# Patient Record
Sex: MALE | Race: BLACK OR AFRICAN AMERICAN | NOT HISPANIC OR LATINO | URBAN - METROPOLITAN AREA
[De-identification: names, ages, dates, MRNs, and addresses within clinical notes are randomized per-mention and may not be internally consistent; named-entity substitution may affect disease eponyms.]

---

## 2019-06-28 ENCOUNTER — EMERGENCY (EMERGENCY)
Facility: HOSPITAL | Age: 29
LOS: 1 days | Discharge: HOME | End: 2019-06-28
Attending: EMERGENCY MEDICINE | Admitting: EMERGENCY MEDICINE
Payer: MEDICAID

## 2019-06-28 VITALS
SYSTOLIC BLOOD PRESSURE: 124 MMHG | OXYGEN SATURATION: 100 % | RESPIRATION RATE: 15 BRPM | TEMPERATURE: 98 F | DIASTOLIC BLOOD PRESSURE: 74 MMHG | HEART RATE: 76 BPM

## 2019-06-28 DIAGNOSIS — M79.602 PAIN IN LEFT ARM: ICD-10-CM

## 2019-06-28 DIAGNOSIS — L73.9 FOLLICULAR DISORDER, UNSPECIFIED: ICD-10-CM

## 2019-06-28 PROCEDURE — 99283 EMERGENCY DEPT VISIT LOW MDM: CPT | Mod: 25

## 2019-06-28 RX ORDER — CEPHALEXIN 500 MG
1 CAPSULE ORAL
Qty: 21 | Refills: 0
Start: 2019-06-28 | End: 2019-07-04

## 2019-06-28 RX ORDER — CEPHALEXIN 500 MG
500 CAPSULE ORAL ONCE
Refills: 0 | Status: COMPLETED | OUTPATIENT
Start: 2019-06-28 | End: 2019-06-28

## 2019-06-28 RX ORDER — IBUPROFEN 200 MG
1 TABLET ORAL
Qty: 30 | Refills: 0
Start: 2019-06-28

## 2019-06-28 RX ORDER — IBUPROFEN 200 MG
1 TABLET ORAL
Qty: 20 | Refills: 0
Start: 2019-06-28 | End: 2019-07-02

## 2019-06-28 RX ORDER — IBUPROFEN 200 MG
600 TABLET ORAL ONCE
Refills: 0 | Status: COMPLETED | OUTPATIENT
Start: 2019-06-28 | End: 2019-06-28

## 2019-06-28 RX ADMIN — Medication 600 MILLIGRAM(S): at 04:24

## 2019-06-28 RX ADMIN — Medication 500 MILLIGRAM(S): at 04:25

## 2019-06-28 RX ADMIN — Medication 1 TABLET(S): at 04:25

## 2019-06-28 NOTE — ED ADULT TRIAGE NOTE - CHIEF COMPLAINT QUOTE
Pt with complaint of lymph node swelling to his left armpit X 2 days. States it is painful. Denies any fevers or chills.

## 2019-06-28 NOTE — ED ADULT NURSE NOTE - NSIMPLEMENTINTERV_GEN_ALL_ED
Implemented All Universal Safety Interventions:  [place] to call system. Call bell, personal items and telephone within reach. Instruct patient to call for assistance. Room bathroom lighting operational. Non-slip footwear when patient is off stretcher. Physically safe environment: no spills, clutter or unnecessary equipment. Stretcher in lowest position, wheels locked, appropriate side rails in place.

## 2019-06-28 NOTE — ED PROVIDER NOTE - OBJECTIVE STATEMENT
28 yo male pt, no hx of med problems, nkda. Presents w L axially area of tenderness for 3 days. no fever, no chills, no chest pain, no sob. no prior episodes of cellulitis/abscess in the past.

## 2019-06-28 NOTE — ED ADULT NURSE NOTE - OBJECTIVE STATEMENT
Pt is a 29y male complaining of right underarm pain. Pt states Pt is a 29y male complaining of right underarm swelling and tenderness x3 days.

## 2019-07-07 ENCOUNTER — EMERGENCY (EMERGENCY)
Facility: HOSPITAL | Age: 29
LOS: 1 days | Discharge: HOME | End: 2019-07-07
Attending: EMERGENCY MEDICINE | Admitting: EMERGENCY MEDICINE
Payer: SELF-PAY

## 2019-07-07 VITALS
HEART RATE: 73 BPM | DIASTOLIC BLOOD PRESSURE: 78 MMHG | OXYGEN SATURATION: 99 % | RESPIRATION RATE: 16 BRPM | SYSTOLIC BLOOD PRESSURE: 127 MMHG | TEMPERATURE: 98 F

## 2019-07-07 DIAGNOSIS — R07.89 OTHER CHEST PAIN: ICD-10-CM

## 2019-07-07 DIAGNOSIS — R21 RASH AND OTHER NONSPECIFIC SKIN ERUPTION: ICD-10-CM

## 2019-07-07 PROCEDURE — 93010 ELECTROCARDIOGRAM REPORT: CPT

## 2019-07-07 PROCEDURE — 71046 X-RAY EXAM CHEST 2 VIEWS: CPT | Mod: 26

## 2019-07-07 PROCEDURE — 99284 EMERGENCY DEPT VISIT MOD MDM: CPT | Mod: 25

## 2019-07-07 NOTE — ED PROVIDER NOTE - OBJECTIVE STATEMENT
Triage note: Pt with c/o skin rash to bilateral hands. Started on abx for swollen Lt axillary lymph node 1 week ago. Also reports some Lt sided Chest discomfort since last night. Hx heart murmur  Vitals: HR 73, /78, Resp 16, Temp(F) 98.1, SpO2 99  Present during history-taking: Elie Oreilly (patient), Caren (pt's partner), Dr. Crow (attending), Manasa Reid (scribe)     29y M with no pertinent PMHx presents to ED c/o rash. Pt states rash began Triage note: Pt with c/o skin rash to bilateral hands. Started on abx for swollen Lt axillary lymph node 1 week ago. Also reports some Lt sided Chest discomfort since last night. Hx heart murmur  Vitals: HR 73, /78, Resp 16, Temp(F) 98.1, SpO2 99  Present during history-taking: Elie Oreilly (patient), Caren (pt's partner), Dr. Crow (attending), Manasa Reid (scribe)     29y M with PMHx heart murmur presents to ED c/o rash. Pt states rash is itchy and began 2 days ago on the back of his right hand. It progressively spread along his right wrist and forearm as well as his left hand and wrist. Pt states rash began soon after taking the bactrim and keflex he was prescribed after he was seen in this ED 4 days ago for left axillar lymph node swelling, which has since improved. Last took abx last night. Pt also notes left-sided chest pain that began when he went to sleep last night, improved upon waking up this morning, and worsened while walking to the ED, with some mild SOB while walking. Pt states the chest pain feels different from the left axillary pain he experienced 4 days ago. No SOB at present. Denies fever/chills, hand pain, weight loss, joint pain, cough. No exposure to new chemicals, soaps, lotions, or animals. Triage note: Pt with c/o skin rash to bilateral hands. Started on abx for swollen Lt axillary lymph node 1 week ago. Also reports some Lt sided Chest discomfort since last night. Hx heart murmur  Vitals: HR 73, /78, Resp 16, Temp(F) 98.1, SpO2 99  Present during history-taking: Elie Oreilly (patient), Caren (pt's partner), Dr. Crow (attending), Manasa Reid (scribe)     29y M with PMHx heart murmur presents to ED c/o rash. Pt states rash is itchy and began 2 days ago on the back of his right hand. It progressively spread along his right wrist and forearm as well as his left hand and wrist. Pt states rash began soon after taking the bactrim and keflex he was prescribed after he was seen in this ED 4 days ago for left axillar lymph node swelling, which has since improved. Last took abx last night. Pt also notes left-sided chest pain that began when he went to sleep last night, improved upon waking up this morning, and then returned while he was waiting in the ER. the cp has resolved at time of HPI.  Pt states the chest pain feels different from the left axillary pain he experienced 4 days ago. No SOB at present. Denies fever/chills, hand pain, weight loss, joint pain, cough. No exposure to new chemicals, soaps, lotions, or animals.

## 2019-07-07 NOTE — ED ADULT NURSE NOTE - PAIN RATING/NUMBER SCALE (0-10): ACTIVITY
A person by the name of Candace Bates has called the clinic 3 times asking for Pt to be seen by a Dr at [facility]. Candace called 1 time yesterday & two times today. The first two times she called she did not know the Pt's last name. Candace told me that she went back to [employer] [employer]. & asked the Pt what her last name &  are. Candace states that Pt has a sore that is not being treated. Candace was told that we do not have permission to discuss health information with her & she would need to discuss her concerns with the nursing home & they can arrange Pt to be seen by a Dr. Candace repeated her story several times until she finally agreed to have the Pt contact her nursing home for treatment.   4

## 2019-07-07 NOTE — ED PROVIDER NOTE - MUSCULOSKELETAL, MLM
Spine appears normal, range of motion is not limited, no muscle or joint tenderness LUE: FROM of all joints. No swelling, erythema, tenderness, increased warmth. Chest wall nontender. No rash, erythema, or swelling.

## 2019-07-07 NOTE — ED PROVIDER NOTE - CLINICAL SUMMARY MEDICAL DECISION MAKING FREE TEXT BOX
ED evaluation and management discussed with the patient and family (if available) in detail.  Close PMD follow up encouraged.  Strict ED return instructions discussed in detail and patient given the opportunity to ask any questions about their discharge diagnosis and instructions. Patient verbalized understanding.    contact dermatitis v drug reaction

## 2019-07-07 NOTE — ED PROVIDER NOTE - DISPOSITION TYPE
Anticoagulation Progress Note      Indication: Cerbral artery occlusion with cerebral infraction, A Fib  Goal INR: 2.0-3.0  Duration: Long Term  Pertinent History: n/a    Assessment  High-Risk Medications: n/a  Significant Findings: n/a  Hospitalizations / Procedures: n/a  Vitamin K goal: 1/2 cup twice a week  Other: n/a      Plan  INR Result: 1.9  The INR result for today is therapeutic based on the INR goal 2.0-3.0.  Etiology: n/a  Recommended Dose: 4.5mg Tue, Thur Sat 3mg ROW (25.5 mg/wk)  Follow-Up: 4 weeks (4/11/2019)  Comments: n/a      Counseling  Counseled about signs/symptoms of bruising/bleeding and appropriate management  Counseled about signs/symptoms of thrombosis and/or stroke and when to seek emergent care  Stressed importance of compliance with exact recommended dosage regimen  Stressed importance of compliance with consistent vitamin K intake  Strongly advised to limit/avoid alcohol consumption  Instructed to notify clinic about medication changes or health status changes  Instructed to notify clinic about scheduled procedures  Discussed risk of thrombosis and/or bleeding with inappropriate anticoagulant use    Patient (or family/caregiver) verbalized understanding and agreement with plan.      
DISCHARGE

## 2019-07-07 NOTE — ED PROVIDER NOTE - HEME LYMPH, MLM
No adenopathy or splenomegaly. No cervical or inguinal lymphadenopathy. No lymphadenopathy on left axilla (where previous lymph node had been identified). left axillary ln normal

## 2019-07-07 NOTE — ED PROVIDER NOTE - NSFOLLOWUPINSTRUCTIONS_ED_ALL_ED_FT
stop antibiotics    follow up with dermatology if symptoms persist or worsen    return to ER for fever worsening of rash or any other concern

## 2019-07-07 NOTE — ED PROVIDER NOTE - DIAGNOSTIC INTERPRETATION
Interpreted by MD Dr. Crow  Chest x-ray, 2 views  Lungs clear, heart shadow normal, bony structures normal, no free air under diaphragm, no PTX

## 2019-07-07 NOTE — ED PROVIDER NOTE - ENMT, MLM
Airway patent, Nasal mucosa clear. Mouth with normal mucosa. Throat has no vesicles, no oropharyngeal exudates and uvula is midline. Airway patent, Nasal mucosa clear. Mouth with normal mucosa. Throat has no vesicles, no oropharyngeal exudates and uvula is midline. no intraoral rask

## 2019-07-07 NOTE — ED ADULT NURSE NOTE - NSIMPLEMENTINTERV_GEN_ALL_ED
Implemented All Universal Safety Interventions:  [place] to call system. Call bell, personal items and telephone within reach. Instruct patient to call for assistance. Room bathroom lighting operational. Non-slip footwear when patient is off stretcher. Physically safe environment: no spills, clutter or unnecessary equipment. Stretcher in lowest position, wheels locked, appropriate side rails in place.

## 2019-07-07 NOTE — ED PROVIDER NOTE - SKIN, MLM
Skin normal color for race, warm, dry and intact. No evidence of rash. Fine, raised, nonerythematous rash on the dorsal and palmar aspects of bilateral hands and distal wrists. No rash on the dorsal or plantar aspects of bilateral feet. No lesions on or around pt's mouth. No rash on chest, arms, legs, or back. Fine, raised, nonerythematous rash on the dorsal and palmar aspects of bilateral hands and distal wrists. No rash on the dorsal or plantar aspects of bilateral feet. No lesions on or around pt's mouth. No rash on chest, arms, legs, or back. No lymphadenopathy on left axilla (where previous lymph node had been identified).

## 2019-07-07 NOTE — ED ADULT TRIAGE NOTE - CHIEF COMPLAINT QUOTE
Pt with c/o skin rash to bilateral hands. Started on abx for swollen Lt axillary lymph node 1 week ago. Also reports some Lt sided Chest discomfort since last night. Hx heart murmur.
